# Patient Record
Sex: FEMALE | Race: WHITE
[De-identification: names, ages, dates, MRNs, and addresses within clinical notes are randomized per-mention and may not be internally consistent; named-entity substitution may affect disease eponyms.]

---

## 2019-07-16 ENCOUNTER — HOSPITAL ENCOUNTER (INPATIENT)
Dept: HOSPITAL 96 - M.ERS | Age: 84
LOS: 2 days | Discharge: HOME | DRG: 178 | End: 2019-07-18
Attending: INTERNAL MEDICINE | Admitting: INTERNAL MEDICINE
Payer: MEDICARE

## 2019-07-16 VITALS — DIASTOLIC BLOOD PRESSURE: 69 MMHG | SYSTOLIC BLOOD PRESSURE: 157 MMHG

## 2019-07-16 VITALS — SYSTOLIC BLOOD PRESSURE: 129 MMHG | DIASTOLIC BLOOD PRESSURE: 51 MMHG

## 2019-07-16 VITALS — BODY MASS INDEX: 25.97 KG/M2 | HEIGHT: 64.02 IN | WEIGHT: 152.1 LBS

## 2019-07-16 VITALS — SYSTOLIC BLOOD PRESSURE: 162 MMHG | DIASTOLIC BLOOD PRESSURE: 62 MMHG

## 2019-07-16 VITALS — SYSTOLIC BLOOD PRESSURE: 184 MMHG | DIASTOLIC BLOOD PRESSURE: 98 MMHG

## 2019-07-16 VITALS — DIASTOLIC BLOOD PRESSURE: 62 MMHG | SYSTOLIC BLOOD PRESSURE: 148 MMHG

## 2019-07-16 DIAGNOSIS — Z90.710: ICD-10-CM

## 2019-07-16 DIAGNOSIS — I10: ICD-10-CM

## 2019-07-16 DIAGNOSIS — D68.69: ICD-10-CM

## 2019-07-16 DIAGNOSIS — Z79.01: ICD-10-CM

## 2019-07-16 DIAGNOSIS — Z88.8: ICD-10-CM

## 2019-07-16 DIAGNOSIS — I48.0: ICD-10-CM

## 2019-07-16 DIAGNOSIS — E89.0: ICD-10-CM

## 2019-07-16 DIAGNOSIS — G43.909: ICD-10-CM

## 2019-07-16 DIAGNOSIS — I70.90: ICD-10-CM

## 2019-07-16 DIAGNOSIS — Z79.899: ICD-10-CM

## 2019-07-16 DIAGNOSIS — J15.6: Primary | ICD-10-CM

## 2019-07-16 DIAGNOSIS — Z86.73: ICD-10-CM

## 2019-07-16 LAB
ABSOLUTE BASOPHILS: 0 THOU/UL (ref 0–0.2)
ABSOLUTE EOSINOPHILS: 0.3 THOU/UL (ref 0–0.7)
ABSOLUTE MONOCYTES: 0.6 THOU/UL (ref 0–1.2)
ALBUMIN SERPL-MCNC: 3.6 G/DL (ref 3.4–5)
ALP SERPL-CCNC: 95 U/L (ref 46–116)
ALT SERPL-CCNC: 21 U/L (ref 30–65)
ANION GAP SERPL CALC-SCNC: 7 MMOL/L (ref 7–16)
APTT BLD: 27.3 SECONDS (ref 25–31.3)
AST SERPL-CCNC: 16 U/L (ref 15–37)
BASOPHILS NFR BLD AUTO: 0.6 %
BILIRUB SERPL-MCNC: 0.5 MG/DL
BUN SERPL-MCNC: 14 MG/DL (ref 7–18)
CALCIUM SERPL-MCNC: 9.8 MG/DL (ref 8.5–10.1)
CHLORIDE SERPL-SCNC: 105 MMOL/L (ref 98–107)
CK-MB MASS: 1.1 NG/ML
CO2 SERPL-SCNC: 32 MMOL/L (ref 21–32)
CREAT SERPL-MCNC: 0.8 MG/DL (ref 0.6–1.3)
EOSINOPHIL NFR BLD: 3.6 %
GLUCOSE SERPL-MCNC: 115 MG/DL (ref 70–99)
GRANULOCYTES NFR BLD MANUAL: 58.1 %
HCT VFR BLD CALC: 45.1 % (ref 37–47)
HGB BLD-MCNC: 15.1 GM/DL (ref 12–15)
INR PPP: 1
LIPASE: 76 U/L (ref 73–393)
LYMPHOCYTES # BLD: 2.3 THOU/UL (ref 0.8–5.3)
LYMPHOCYTES NFR BLD AUTO: 30.1 %
MAGNESIUM SERPL-MCNC: 2 MG/DL (ref 1.8–2.4)
MCH RBC QN AUTO: 31.1 PG (ref 26–34)
MCHC RBC AUTO-ENTMCNC: 33.4 G/DL (ref 28–37)
MCV RBC: 93 FL (ref 80–100)
MONOCYTES NFR BLD: 7.6 %
MPV: 7.4 FL. (ref 7.2–11.1)
NEUTROPHILS # BLD: 4.4 THOU/UL (ref 1.6–8.1)
NT-PRO BRAIN NAT PEPTIDE: 657 PG/ML (ref ?–300)
NUCLEATED RBCS: 0 /100WBC
PLATELET COUNT*: 338 THOU/UL (ref 150–400)
POTASSIUM SERPL-SCNC: 3.5 MMOL/L (ref 3.5–5.1)
PROT SERPL-MCNC: 7.7 G/DL (ref 6.4–8.2)
PROTHROMBIN TIME: 10 SECONDS (ref 9.2–11.5)
RBC # BLD AUTO: 4.85 MIL/UL (ref 4.2–5)
RDW-CV: 13.2 % (ref 10.5–14.5)
SODIUM SERPL-SCNC: 144 MMOL/L (ref 136–145)
TROPONIN-I LEVEL: <0.06 NG/ML (ref ?–0.06)
WBC # BLD AUTO: 7.6 THOU/UL (ref 4–11)

## 2019-07-16 NOTE — 2DMMODE
[place] [address]
Phone:  [phone] 2 D/M-MODE ECHOCARDIOGRAM     
_______________________________________________________________________________
 
Name:         KEE HELMS             Room:          [address] [place] 
[facility]#:    [id number]     Account #:     [id number]  
Admission:    19    Attend Phys:   Codey De León, 
Discharge:                Date of Birth: [birth year]  
Date of Service: 19 1645  Report #:      [id number]
        [id number]
_______________________________________________________________________________
THIS REPORT FOR:  //name//                      
 
 
--------------- APPROVED REPORT --------------
 
 
Study performed:  2019 15:02:56
 
EXAM: Comprehensive 2D, Doppler, and color-flow 
Echocardiogram 
Patient Location: In-Patient   
Room #:  er     Status:  routine
 
      BSA:         1.71
HR: 67 bpm BP:          168/85 mmHg 
Rhythm: NSR     
 
Other Information 
Study Quality: Good
 
Indications
Atrial Fibrillation
 
2D Dimensions
IVSd:  12.14 (7-11mm) LVOT Diam:  22.26 (18-24mm) 
LVDd:  32.81 mm  
PWd:  11.66 (7-11mm) Ascending Ao:  42.37 (22-36mm)
LVDs:  22.88 (25-40mm) 
Aortic Root:  29.67 mm 
 
Volumes
Left Atrial Volume (Systole) 
    LA ESV Index:  26.90 mL/m2
 
Aortic Valve
AoV Peak Errol.:  1.32 m/s 
AO Peak Gr.:  6.97 mmHg  LVOT Max P.32 mmHg
AO Mean Gr.:  3.44 mmHg  LVOT Mean P.11 mmHg
    LVOT Max V:  1.04 m/s
AO V2 VTI:  22.31 cm  LVOT Mean V:  0.66 m/s
JAM (VTI):  3.46 cm2  LVOT V1 VTI:  19.81 cm
 
Mitral Valve
    E/A Ratio:  0.75
    MV Decel. Time:  240.35 ms
MV E Max Errol.:  0.66 m/s 
 
 
[place] [address]
Phone:  [phone]                     2 D/M-MODE ECHOCARDIOGRAM     
_______________________________________________________________________________
 
Name:         KEE HELMS             Room:          [address] [place] 
.R.#:    [id number]     Account #:     [id number]  
Admission:    19    Attend Phys:   Codey De León, 
Discharge:                Date of Birth: [birth year]  
Date of Service: 19 1645  Report #:      [id number]
        [id number]
_______________________________________________________________________________
MV PHT:  69.70 ms  
MVA (PHT):  3.16 cm2  
 
TDI
E/Lateral E':  9.43 E/Medial E':  13.20
   Medial E' Errol.:  0.05 m/s
   Lateral E' Errol.:  0.07 m/s
 
Pulmonary Valve
PV Peak Errol.:  0.97 m/s PV Peak Gr.:  3.73 mmHg
 
Tricuspid Valve
    RAP Estimate:  5.00 mmHg
TR Peak Gr.:  20.01 mmHg RVSP:  25.00 mmHg
    PA Pressure:  25.00 mmHg
 
Left Ventricle
The left ventricle is normal size. There is normal LV segmental wall 
motion. There is normal left ventricular wall thickness. Left 
ventricular systolic function is normal. LVEF is 60-65%. Grade I - 
abnormal relaxation pattern.
 
Right Ventricle
The right ventricle is normal size. The right ventricular systolic 
function is normal.
 
Atria
The left atrium size is normal. The right atrium size is 
normal.
 
Aortic Valve
The aortic valve is normal in structure. Trace aortic regurgitation. 
There is no aortic valvular stenosis.
 
Mitral Valve
Mild bileaflet mitral valve prolapse Moderate mitral regurgitation. 
No evidence of mitral valve stenosis.
 
Tricuspid Valve
The tricuspid valve is normal in structure. Trace tricuspid 
regurgitation. No pulmonary hypertension.
 
Pulmonic Valve
The pulmonary valve is normal in structure. Mild pulmonic 
regurgitation.
 
 
 
[place] [address]
Phone:  [phone] 2 D/M-MODE ECHOCARDIOGRAM     
_______________________________________________________________________________
 
Name:         KEE HELMS             Room:          [address] [place] 
[facility]#:    [id number]     Account #:     [id number]  
Admission:    19    Attend Phys:   Codey De León, 
Discharge:                Date of Birth: [birth year]  
Date of Service: 19 1645  Report #:      [id number]
        [id number]
_______________________________________________________________________________
Great Vessels
The aortic root is normal in size. The ascending aorta is moderately 
dilated. (4.24 cm) IVC is normal in size and collapses >50% with 
inspiration.
 
Pericardium
There is no pericardial effusion.
 
<Conclusion>
The left ventricle is normal size.
There is normal left ventricular wall thickness.
Left ventricular systolic function is normal.
LVEF is 60-65%.
Grade I - abnormal relaxation pattern.
Trace aortic regurgitation.
Mild bileaflet mitral valve prolapse
Moderate mitral regurgitation.
Trace tricuspid regurgitation.
No pulmonary hypertension.
Mild pulmonic regurgitation.
IVC is normal in size and collapses >50% with inspiration.
The ascending aorta is moderately dilated. (4.24 cm)
 
 
 
 
 
 
 
 
 
 
 
 
 
 
 
 
 
 
 
 
 
 
  <ELECTRONICALLY SIGNED>
                                           By: Patel Franco MD, FACC   
  19     1645
D: 19 1645   _____________________________________
T: 19 1645   Patel Franco MD, FACC     /INF

## 2019-07-16 NOTE — EKG
[place] [address]
Phone:  [phone]                     ELECTROCARDIOGRAM REPORT      
_______________________________________________________________________________
 
Name:       KEE HELMS              Room:           [address]    ADM [place]  
.R.#:  [id number]      Account #:      [id number]  
Admission:  19     Attend Phys:    Codey De León MD 
Discharge:               Date of Birth:  [birth year]  
         Report #: [id number]
    [id number]
_______________________________________________________________________________
THIS REPORT FOR:  //name//                      
 
                         [facility] ED
                                       
Test Date:    2019               Test Time:    12:34:23
Pat Name:     KEE HELMS          Department:   
Patient ID:   [id number]            Room:         [facility]
Gender:       F                        Technician:   
:          [id number]               Requested By: Vernon Agee
Order Number: [id number] MD:   Patel Franco
                                 Measurements
Intervals                              Axis          
Rate:         144                      P:            
PA:                                    QRS:          -64
QRSD:         121                      T:            100
QT:           277                                    
QTc:          429                                    
                           Interpretive Statements
Atrial fibrillation with rapid ventricular response
Left bundle branch block
Compared to ECG 04/10/2010 16:47:47
Left bundle-branch block now present
Sinus rhythm no longer present
Atrial premature complex(es) no longer present
First degree AV block no longer present
Right superior axis no longer present
Myocardial infarct finding no longer present
 
Electronically Signed On 2019 16:25:51 CDT by Patel Franco
[email or web address]
 
 
 
 
 
 
 
 
 
 
 
 
 
  <ELECTRONICALLY SIGNED>
                                           By: Patel Franco MD, FAC   
  19     1625
D: 19 1234   _____________________________________
T: 19 1234   Patel Franco MD, [facility]     /EPI

## 2019-07-16 NOTE — EKG
[place] [address]
Phone:  [phone]                     ELECTROCARDIOGRAM REPORT      
_______________________________________________________________________________
 
Name:       KEE HELMS              Room:           [address]    ADM [place]  
.R.#:  [id number]      Account #:      [id number]  
Admission:  19     Attend Phys:    Codey De León MD 
Discharge:               Date of Birth:  [birth year]  
         Report #: [id number]
    [id number]
_______________________________________________________________________________
THIS REPORT FOR:  //name//                      
 
                         [facility] ED
                                       
Test Date:    2019               Test Time:    13:12:31
Pat Name:     KEE HELMS          Department:   
Patient ID:   [id number]            Room:         [facility]
Gender:       F                        Technician:   
:          [id number]               Requested By: Vernon Agee
Order Number: [id number] MD:   Patel Franco
                                 Measurements
Intervals                              Axis          
Rate:         82                       P:            -9
FL:           227                      QRS:          -58
QRSD:         128                      T:            83
QT:           415                                    
QTc:          485                                    
                           Interpretive Statements
Sinus rhythm
Atrial premature complex
Prolonged FL interval
Consider left atrial enlargement
Left bundle branch block
Compared to ECG 04/10/2010 16:47:47
Left bundle-branch block now present
Right superior axis no longer present
Myocardial infarct finding no longer present
 
Electronically Signed On 2019 16:25:58 CDT by Patel Franco
[email or web address]
 
 
 
 
 
 
 
 
 
 
 
 
 
  <ELECTRONICALLY SIGNED>
                                           By: Patel Franco MD, FACC   
  19     1625
D: 19 1312   _____________________________________
T: 19 1312   Patel Franco MD, FACC     /EPI

## 2019-07-16 NOTE — NUR
PT ADMITTED TO ROOM 211 VIA CART FROM ED AT APPROXIMATELY 1600 WITH PNEUMONIA
AND AFIB. PT ORIENTED TO ROOM AND CALL LIGHT. ADMISSION ASSESSMENT AND HISTORY
COMPLETED. REFER TO CHARTING. PT SCREENED NEGATIVE FOR SEPSIS. MEDICARE AND
FALL AGREEMENT FORM SIGNED AND PLACED IN FRONT OF CHART. PT TRACING SR WITH
BBB, FIRST DEGREE AND PACS ON THE CARDIAC MONITOR. ON 2L NC SAT UPPER 90'S. PT
UP WITH 1 ASSIST AND WALKER TO BATHROOM. WEAKNESS NOTED. SON AND DAUGHTER AT
BEDSIDE AND UPDATED ON CURRENT PLAN OF CARE. CARDIOLOGY CONSULT IN PLACE. HOME
MEDICATIONS RECONCILED AND RESTARTED. REFER TO EMAR. PT REPOSITIONS SELF.
HOURLY ROUNDING OBSERVED. BED IN LOW POSITION. CALL LIGHT WITHIN REACH. WILL
CONTINUE PLAN OF CARE.

## 2019-07-17 VITALS — SYSTOLIC BLOOD PRESSURE: 176 MMHG | DIASTOLIC BLOOD PRESSURE: 67 MMHG

## 2019-07-17 VITALS — SYSTOLIC BLOOD PRESSURE: 107 MMHG | DIASTOLIC BLOOD PRESSURE: 59 MMHG

## 2019-07-17 VITALS — DIASTOLIC BLOOD PRESSURE: 58 MMHG | SYSTOLIC BLOOD PRESSURE: 136 MMHG

## 2019-07-17 VITALS — SYSTOLIC BLOOD PRESSURE: 123 MMHG | DIASTOLIC BLOOD PRESSURE: 55 MMHG

## 2019-07-17 VITALS — SYSTOLIC BLOOD PRESSURE: 137 MMHG | DIASTOLIC BLOOD PRESSURE: 62 MMHG

## 2019-07-17 LAB
ABSOLUTE BASOPHILS: 0.1 THOU/UL (ref 0–0.2)
ABSOLUTE EOSINOPHILS: 0.3 THOU/UL (ref 0–0.7)
ABSOLUTE MONOCYTES: 0.7 THOU/UL (ref 0–1.2)
ANION GAP SERPL CALC-SCNC: 5 MMOL/L (ref 7–16)
BASOPHILS NFR BLD AUTO: 1 %
BUN SERPL-MCNC: 16 MG/DL (ref 7–18)
CALCIUM SERPL-MCNC: 9 MG/DL (ref 8.5–10.1)
CHLORIDE SERPL-SCNC: 106 MMOL/L (ref 98–107)
CHOLEST SERPL-MCNC: 183 MG/DL (ref ?–200)
CO2 SERPL-SCNC: 34 MMOL/L (ref 21–32)
CREAT SERPL-MCNC: 0.7 MG/DL (ref 0.6–1.3)
EOSINOPHIL NFR BLD: 4.8 %
GLUCOSE SERPL-MCNC: 93 MG/DL (ref 70–99)
GRANULOCYTES NFR BLD MANUAL: 54.2 %
HCT VFR BLD CALC: 37.9 % (ref 37–47)
HDLC SERPL-MCNC: 78 MG/DL (ref 40–?)
HGB BLD-MCNC: 12.8 GM/DL (ref 12–15)
LDLC SERPL-MCNC: 100 MG/DL (ref ?–100)
LYMPHOCYTES # BLD: 1.8 THOU/UL (ref 0.8–5.3)
LYMPHOCYTES NFR BLD AUTO: 29.4 %
MCH RBC QN AUTO: 31.5 PG (ref 26–34)
MCHC RBC AUTO-ENTMCNC: 33.7 G/DL (ref 28–37)
MCV RBC: 93.6 FL (ref 80–100)
MONOCYTES NFR BLD: 10.6 %
MPV: 7.4 FL. (ref 7.2–11.1)
NEUTROPHILS # BLD: 3.4 THOU/UL (ref 1.6–8.1)
NUCLEATED RBCS: 0 /100WBC
PLATELET COUNT*: 277 THOU/UL (ref 150–400)
POTASSIUM SERPL-SCNC: 4 MMOL/L (ref 3.5–5.1)
RBC # BLD AUTO: 4.05 MIL/UL (ref 4.2–5)
RDW-CV: 13.4 % (ref 10.5–14.5)
SODIUM SERPL-SCNC: 145 MMOL/L (ref 136–145)
TC:HDL: 2.3 RATIO
TRIGL SERPL-MCNC: 29 MG/DL (ref ?–150)
VLDLC SERPL CALC-MCNC: 6 MG/DL (ref ?–40)
WBC # BLD AUTO: 6.2 THOU/UL (ref 4–11)

## 2019-07-17 NOTE — CARDNUC
[place] [address]
Phone:  [phone]                     CARDIAC NUCLEAR IMAGING REPORT
_______________________________________________________________________________
 
Name:         KEE HELMS             Room:          [address] [place] 
[facility]#:    [id number]     Account #:     [id number]  
Admission:    07/16/19    Attend Phys:   Codey De León, 
Discharge:                Date of Birth: [birth year]  
Date of Service: 07/17/19 1630  Report #:      [id number]
        [id number] 
_______________________________________________________________________________
THIS REPORT FOR:  //name//                      
 
 
--------------- APPROVED REPORT --------------
 
 
Imaging Protocol: Rest Tc-99m/Stress Tc-99m 1 day
Study performed:  07/16/2019 14:19:00
 
Indication: Chest pain
Patient Location: In-Patient
Stress Tech: Mitra Vega
Stress Nurse: Claudia Zurita RN
NM Tech:SCOTTY Ivey
 
Ht: 5 ft 3 in  Wt: 146 lbs  BSA:  1.69 m2
    BMI:  25.85
 
Medical History
Medical History: tia, pat, htn
Medications: plavix, sotalol
Allergies: opiate agonists, meperidine, antihistamine, 
promethazine
Cardiac Risk Factors: age, htn, family hx
Exercise History: Sedentary
 
Resting Data
Rest SPECT myocardial perfusion imaging was performed in supine 
position 30 minutes following the intravenous injection of 10.7 mCi 
of Tc-99m Sestamibi.
Time of rest injection: 0805     Date: 07/17/2019
The images were gated to evaluate regional wall motion and calculate 
left ventricular ejection fraction. 
Administration Route: IV
 
Pharmacologic Stress
Pharmacologic stress test was performed by injecting Regadenoson 0.4 
mg IV push over 10-15 seconds immediately followed by the intravenous 
injection of 35.3 mCi of Tc-99m Sestamibi.
Time of stress injection: 0945     Date: 07/17/2019
Administration Route: IV
Gated Stress SPECT was performed 40 minutes after stress 
injection.
The images were gated to evaluate regional wall motion and calculate 
left ventricular ejection fraction. 
Stress only was performed in the Supine position.
 
 
[place] [address]
Phone:  [phone]                     CARDIAC NUCLEAR IMAGING REPORT
_______________________________________________________________________________
 
Name:         KEE HELMS             Room:          [address] [place] 
..#:    [id number]     Account #:     [id number]  
Admission:    07/16/19    Attend Phys:   Codey De León, 
Discharge:                Date of Birth: [birth year]  
Date of Service: 07/17/19 1630  Report #:      [id number]
        [id number] 
_______________________________________________________________________________
 
Stress Test Details
Stress Test:  Pharmacologic stress testing performed using 0.4 mg of 
regadenoson per 5 mL given IV over 10 seconds.      
  Reason for pharmacologic stress test: arthritis.      
 
HR      Max Heart Rate (APMHR): 139 bpm  
Resting HR:            57 bpm   Target HR (85% APMHR): 118 bpm  
Max HR Achieved:  80 bpm        
% of APMHR:         57         
 
BP           
Resting BP:  149/73 mmHg        
Max BP:       163/84 mmHg        
Recovery BP:       166/88 mmHg        
ECG           
Resting ECG:  Sinus Rhythm, LBBB       
Stress ECG:     Sinus Rhythm, LBBB       
ST Change: None          
Arrhythmia:    None         
Recovery ECG: Sinus Rhythm, LBBB       
Recovery ST Change: None        
Recovery Arrhythmia: None        
 
Clinical
Reason for Termination: Completed protocol
Exercise duration: 0 min  sec
Exercise capacity: 1 METs
The patient had some chest discomfort with Lexiscan infusion felt to 
be due to medication effect in light of myocardial perfusion imaging 
findings.
 
Nurse Comments
pt co cp during test resolved with caffeine
 
Stress ECG Conclusion
Baseline EKG shows sinus rhythm with left bundle-branch block. EKGs 
obtained during and post Lexiscan infusion show sinus rhythm with 
left bundle-branch block. There were no stress-induced 
arrhythmias.
 
Study Quality
Study: Fair
Artifact: Moderate Soft tissue attenuation artifact
 
Study Data
 
 
[place] [address]
Phone:  [phone]                     CARDIAC NUCLEAR IMAGING REPORT
_______________________________________________________________________________
 
Name:         KEE HELMS             Room:          [address] [place] 
..#:    [id number]     Account #:     [id number]  
Admission:    07/16/19    Attend Phys:   Codey De León, 
Discharge:                Date of Birth: [birth year]  
Date of Service: 07/17/19 1630  Report #:      [id number]
        [id number] 
_______________________________________________________________________________
At rest, the left ventricular ejection fraction was 70%..   
Post stress, the left ventricular ejection was 63%..   
TID = 0.97.       
 
Perfusion
There is moderate attenuation of the inferior wall noted. No 
significant fixed or reversible defects were identified.
 
Wall Motion
Normal left ventricular wall motion.
 
Nuclear Conclusion
ECG Findings: non-diagnostic 
Clinical Findings: equivocal 
Nuclear Findings: negative for ischemia 
Exercise Capacity: not assessed
Left Ventricular Function: normal 
Risk Study: low
Perfusion images show no defect to suggest infarct or ischemia. Left 
ventricular systolic function is normal on gated studies. This is a 
low risk study. 
 
<Conclusion>
Baseline EKG shows sinus rhythm with left bundle-branch block. EKGs 
obtained during and post Lexiscan infusion show sinus rhythm with 
left bundle-branch block. There were no stress-induced arrhythmias.
 
 
 
 
 
 
 
 
 
 
 
 
 
 
 
 
 
 
  <ELECTRONICALLY SIGNED>
                                           By: Patel Franco MD, FACC   
  07/17/19     1630
D: 07/17/19 1630   _____________________________________
T: 07/17/19 1630   Patel Franco MD, FACC     /INF

## 2019-07-17 NOTE — NUR
ASSUSMED CARE OF PT APPROX 0730. REASSESSMENT COMPLETED AS CHARTED THIS AM. PT
EXPRESSED CONCERN ABOUT PROCEDURE THIS AM. PT EDUCATION GIVEN ABOUT PROCEDURE.
PT VERBALIZED UNDERSTANDING. PT EDUCATION GIVEN ABOUT NEW MEDICATION PT IS
TAKING. PT VERBALIZED UNDERSTNADING.  HOURLY ROUNDING COMPLETED. PERSONAL
ITEMS AND CALL LIGHT WITHIN REACH.

## 2019-07-17 NOTE — NUR
ASSUMED CARE OF PT AT 1900. PT IS ALERT AND ORIENTED. VSS. PERRLA. NO
COMPLAINTS OF PAIN. PT IS NPO FOR STRESS TEST. PT IS IN SINUS CHANO ON THE
TELEMETRY. PT IS RESTING COMFORTABLY IN BED. RESPIRATIONS ARE ENEN AND
NONLABORED. WILL CONTINUE TO MONITOR PT.

## 2019-07-17 NOTE — EKG
[place] [address]
Phone:  [phone]                     ELECTROCARDIOGRAM REPORT      
_______________________________________________________________________________
 
Name:       KEE HELMS              Room:           [address]    ADM IN  
M.R.#:  [id number]      Account #:      [id number]  
Admission:  19     Attend Phys:    Codey De León MD 
Discharge:               Date of Birth:  [birth year]  
         Report #: [id number]
    [id number]
_______________________________________________________________________________
THIS REPORT FOR:  //name//                      
 
                          [facility]
                                       
Test Date:    2019               Test Time:    10:04:13
Pat Name:     KEE HELMS          Department:   
Patient ID:   [id number]            Room:         [address]
Gender:       F                        Technician:   
:          [id number]               Requested By: Patel Franco
Order Number: [id number]    Reading MD:   Jorge Pagan
                                 Measurements
Intervals                              Axis          
Rate:         68                       P:            -45
SC:           222                      QRS:          -61
QRSD:         123                      T:            64
QT:           448                                    
QTc:          477                                    
                           Interpretive Statements
Sinus or ectopic atrial rhythm
Prolonged SC interval
Probable left atrial enlargement
Left bundle branch block
Baseline wander in lead(s) V4
Compared to ECG 2019 13:12:31
Atrial premature complex(es) no longer present
 
Electronically Signed On 2019 17:20:38 CDT by Jorge Pagan
[email or web address]
 
 
 
 
 
 
 
 
 
 
 
 
 
 
 
  <ELECTRONICALLY SIGNED>
                                           By: Jorge Pagan MD, [facility]     
  19     1720
D: 19 1004   _____________________________________
T: 19 1004   Jorge Pagan MD, [facility]       /EPI

## 2019-07-18 VITALS — DIASTOLIC BLOOD PRESSURE: 55 MMHG | SYSTOLIC BLOOD PRESSURE: 184 MMHG

## 2019-07-18 VITALS — SYSTOLIC BLOOD PRESSURE: 148 MMHG | DIASTOLIC BLOOD PRESSURE: 75 MMHG

## 2019-07-18 VITALS — DIASTOLIC BLOOD PRESSURE: 42 MMHG | SYSTOLIC BLOOD PRESSURE: 138 MMHG

## 2019-07-18 VITALS — SYSTOLIC BLOOD PRESSURE: 138 MMHG | DIASTOLIC BLOOD PRESSURE: 42 MMHG

## 2019-07-18 VITALS — DIASTOLIC BLOOD PRESSURE: 48 MMHG | SYSTOLIC BLOOD PRESSURE: 151 MMHG

## 2019-07-18 NOTE — NUR
ASSUMED PT CARE AT 0730, FULL ASSESMENT DONE AS CHARTED. PT A/O X4, DENIES
PAIN, VSS, HR IN THE 40'S-50'S, ASYMPTOMATIC. PT STATES HER HR IS NORMALLY
LOW. DR ORTEZ NOTIFIED. DISCHARGE PAPERS RECIEVED, SCRIPTS RECIEVED. REVIEWED
THESE WITH THE PT AND HER DAUGHTER. DTR VERBALIZED UNDERSTANDING. PT WILL NEED
ASSISTANCE WITH NEW MEDICATIONS, SHE IS CONCERNED ABOUT KEEPING THEM STRAIT,
SUGGESTED THE USE OF A DAILY MEDICATION BOX. DAUGHTER STATES SHE WILL HELP.
PTS IV REMOVED, BELONGINGS GATHERED AND PT LEFT UNIT BY  AT APPROX 1455

## 2019-07-18 NOTE — CON
[address]  [address]                    CONSULTATION                  
_______________________________________________________________________________
 
Name:       KEE HELMS              Room:           [address] [place]  
M.R.#:  [id number]      Account #:      [id number]  
Admission:  07/16/19     Attend Phys:    Codey De León MD 
Discharge:  07/18/19     Date of Birth:  [birth year]  
         Report #: [id number]
                                                                     [id number]  
_______________________________________________________________________________
THIS REPORT FOR:  //name//                      
 
CC: Codey Peck DO
 
CARDIOLOGY CONSULTATION
 
INDICATION:  Paroxysmal atrial fibrillation.
 
HISTORY OF PRESENT ILLNESS:  The patient is a very pleasant 83-year-old white
female with no prior cardiac history with the exception of light stroke many
years ago for which she takes Plavix.  She presented to the hospital this
morning after having an episode of prolonged palpitations described as her heart
pounding.  She was found to be in atrial fibrillation with a rapid ventricular
response.  She spontaneously converted to sinus rhythm prior to any medications
being administered.  She reports similar symptoms off and on over the past
several months.  She has never been formally documented with atrial fibrillation
prior.
 
PAST MEDICAL HISTORY:
1.  Stroke.
2.  Hysterectomy.
3.  Thyroidectomy.
4.  Hernia surgery.
 
FAMILY HISTORY:  The patient's son had atrial fibrillation ablation.
 
SOCIAL HISTORY:  The patient does not smoke.  She does not drink alcohol.
 
ALLERGIES:  DARK CHOCOLATE.
 
MEDICATIONS:  Plavix 75 mg daily and a multivitamin daily.
 
REVIEW OF SYSTEMS:  A 14-point review of systems is positive for pneumonia 2
years ago, palpitations presently, arthritis and she wears dentures.  Otherwise,
14-point review of systems was unremarkable.
 
PHYSICAL EXAMINATION:
VITAL SIGNS:  Blood pressure 168/85 and pulse 68.
GENERAL:  This is a pleasant elderly female in no distress.  Mood and affect
appropriate.
HEENT:  Extraocular muscles intact.  Mucous membranes are moist.
NECK:  Shows no jugular venous distention.  There are no carotid bruits.
CHEST:  Reveals coarse breath sounds over the left lung.
CARDIOVASCULAR:  Reveals a regular rhythm with soft grade 2/6 systolic ejection
 
 
 
[place] [address]                    CONSULTATION                  
_______________________________________________________________________________
 
Name:       ENGLISHKEE J              Room:           [address]#:  [id number]      Account #:      [id number]  
Admission:  07/16/19     Attend Phys:    Codey De León MD 
Discharge:  07/18/19     Date of Birth:  [birth year]  
         Report #: [id number]
                                                                     [id number]  
_______________________________________________________________________________
murmur.
ABDOMEN:  Reveals normal bowel sounds.  The abdomen is soft, nontender.
EXTREMITIES:  Shows no edema.
SKIN:  Warm and dry.  Peripheral pulses palpable.
 
LABORATORY AND DIAGNOSTIC DATA:  EKG on admission shows atrial fibrillation with
rapid ventricular response rate and bundle branch block.
 
Labs are reviewed.  Sodium 144, potassium 3.5, chloride 105, bicarbonate 32, BUN
14, creatinine 0.8 and serum glucose 115.  LFTs within normal limits.  Troponin
less than 0.06.  NT-proBNP 657.  Coags within normal limits.  White blood cell
count 7.6, hemoglobin 15.1 and platelet count 338,000.
 
Chest x-ray shows left basilar infiltrate.  There is note of calcification of
the thoracic aorta.
 
IMPRESSION AND RECOMMENDATIONS:
1.  Paroxysmal atrial fibrillation.  The patient's CHADS score is 2.  She
warrants anticoagulation chronically.  She is maintaining sinus rhythm
presently.  We will start low dose sotalol.
2.  Discomforts of the shoulders with episode of atrial fibrillation.  We will
proceed with stress testing.
3.  Atherosclerosis.  We will check fasting lipid profile as the patient likely
would benefit from a lipid lowering agent.
4.  Hypertension.  We will adjust medications as needed.
 
 
 
 
 
 
 
 
 
 
 
 
 
 
 
 
 
 
 
<ELECTRONICALLY SIGNED>
                                        By:  Patel Franco MD, FACC   
07/18/19     1702
D: 07/16/19 1415_______________________________________
T: 07/16/19 2251Microdrick Franco MD, FACC      /nt

## 2019-07-18 NOTE — EKG
[place] [address]
Phone:  [phone]                     ELECTROCARDIOGRAM REPORT      
_______________________________________________________________________________
 
Name:       KEE HELMS              Room:           [address]    DIS IN  
M.R.#:  [id number]      Account #:      [id number]  
Admission:  19     Attend Phys:    Codey De León MD 
Discharge:  19     Date of Birth:  [birth year]  
         Report #: [id number]
    [id number]
_______________________________________________________________________________
THIS REPORT FOR:  //name//                      
 
                          [facility]
                                       
Test Date:    2019               Test Time:    08:23:08
Pat Name:     KEE HELMS          Department:   
Patient ID:   [id number]            Room:         [address]
Gender:       F                        Technician:   
:          [id number]               Requested By: Patel Franco
Order Number: [id number]    Reading MD:   Patel Franco
                                 Measurements
Intervals                              Axis          
Rate:         48                       P:            6
GA:           244                      QRS:          -58
QRSD:         120                      T:            33
QT:           504                                    
QTc:          451                                    
                           Interpretive Statements
Sinus bradycardia
Atrial premature complexes
Prolonged GA interval
Left bundle-branch block 
Compared to ECG 2019 10:04:13
Atrial premature complex(es) now present
No significant changes noted
Electronically Signed On 2019 16:30:42 CDT by Patel Franco
[email or web address]
 
 
 
 
 
 
 
 
 
 
 
 
 
 
 
 
  <ELECTRONICALLY SIGNED>
                                           By: Patel Franco MD, FACC   
  19     1630
D: 1923   _____________________________________
T: 1923   Patel Franco MD, [facility]     /EPI

## 2019-08-14 ENCOUNTER — HOSPITAL ENCOUNTER (OUTPATIENT)
Dept: HOSPITAL 96 - M.LAB | Age: 84
End: 2019-08-14
Attending: NURSE PRACTITIONER
Payer: MEDICARE

## 2019-08-14 DIAGNOSIS — Z98.890: ICD-10-CM

## 2019-08-14 DIAGNOSIS — I48.0: Primary | ICD-10-CM

## 2019-08-16 ENCOUNTER — HOSPITAL ENCOUNTER (EMERGENCY)
Dept: HOSPITAL 96 - M.ERS | Age: 84
Discharge: HOME | End: 2019-08-16
Payer: MEDICARE

## 2019-08-16 VITALS — WEIGHT: 148 LBS | BODY MASS INDEX: 25.27 KG/M2 | HEIGHT: 64 IN

## 2019-08-16 VITALS — SYSTOLIC BLOOD PRESSURE: 168 MMHG | DIASTOLIC BLOOD PRESSURE: 69 MMHG

## 2019-08-16 DIAGNOSIS — N81.2: ICD-10-CM

## 2019-08-16 DIAGNOSIS — Z90.710: ICD-10-CM

## 2019-08-16 DIAGNOSIS — Z88.8: ICD-10-CM

## 2019-08-16 DIAGNOSIS — Z86.73: ICD-10-CM

## 2019-08-16 DIAGNOSIS — N39.0: Primary | ICD-10-CM

## 2019-08-16 DIAGNOSIS — I48.91: ICD-10-CM

## 2019-08-16 DIAGNOSIS — Z90.89: ICD-10-CM

## 2019-08-16 DIAGNOSIS — G43.909: ICD-10-CM

## 2019-08-16 LAB
ABSOLUTE BASOPHILS: 0.1 THOU/UL (ref 0–0.2)
ABSOLUTE EOSINOPHILS: 0.4 THOU/UL (ref 0–0.7)
ABSOLUTE MONOCYTES: 0.6 THOU/UL (ref 0–1.2)
ALBUMIN SERPL-MCNC: 3.3 G/DL (ref 3.4–5)
ALP SERPL-CCNC: 102 U/L (ref 46–116)
ALT SERPL-CCNC: 18 U/L (ref 30–65)
ANION GAP SERPL CALC-SCNC: 6 MMOL/L (ref 7–16)
APTT BLD: 28.2 SECONDS (ref 25–31.3)
AST SERPL-CCNC: 17 U/L (ref 15–37)
BACTERIA-REFLEX: (no result) /HPF
BASOPHILS NFR BLD AUTO: 1.2 %
BILIRUB SERPL-MCNC: 0.4 MG/DL
BILIRUB UR-MCNC: NEGATIVE MG/DL
BUN SERPL-MCNC: 13 MG/DL (ref 7–18)
CALCIUM SERPL-MCNC: 9.1 MG/DL (ref 8.5–10.1)
CHLORIDE SERPL-SCNC: 104 MMOL/L (ref 98–107)
CO2 SERPL-SCNC: 32 MMOL/L (ref 21–32)
COLOR UR: YELLOW
CREAT SERPL-MCNC: 0.7 MG/DL (ref 0.6–1.3)
EOSINOPHIL NFR BLD: 6.3 %
GLUCOSE SERPL-MCNC: 101 MG/DL (ref 70–99)
GRANULOCYTES NFR BLD MANUAL: 53.9 %
HCT VFR BLD CALC: 42.8 % (ref 37–47)
HGB BLD-MCNC: 14.1 GM/DL (ref 12–15)
INR PPP: 1
KETONES UR STRIP-MCNC: NEGATIVE MG/DL
LYMPHOCYTES # BLD: 1.7 THOU/UL (ref 0.8–5.3)
LYMPHOCYTES NFR BLD AUTO: 29.1 %
MCH RBC QN AUTO: 31.3 PG (ref 26–34)
MCHC RBC AUTO-ENTMCNC: 32.9 G/DL (ref 28–37)
MCV RBC: 95 FL (ref 80–100)
MONOCYTES NFR BLD: 9.5 %
MPV: 7.8 FL. (ref 7.2–11.1)
MUCUS: (no result) STRN/LPF
NEUTROPHILS # BLD: 3.2 THOU/UL (ref 1.6–8.1)
NUCLEATED RBCS: 0 /100WBC
PLATELET COUNT*: 320 THOU/UL (ref 150–400)
POTASSIUM SERPL-SCNC: 4.3 MMOL/L (ref 3.5–5.1)
PROT SERPL-MCNC: 7.3 G/DL (ref 6.4–8.2)
PROT UR QL STRIP: NEGATIVE
PROTHROMBIN TIME: 10.5 SECONDS (ref 9.2–11.5)
RBC # BLD AUTO: 4.51 MIL/UL (ref 4.2–5)
RBC # UR STRIP: (no result) /UL
RBC #/AREA URNS HPF: (no result) /HPF (ref 0–2)
RDW-CV: 13.4 % (ref 10.5–14.5)
SODIUM SERPL-SCNC: 142 MMOL/L (ref 136–145)
SP GR UR STRIP: 1.01 (ref 1–1.03)
SQUAMOUS: (no result) /LPF (ref 0–3)
URINE CLARITY: CLEAR
URINE GLUCOSE-RANDOM: NEGATIVE
URINE LEUKOCYTES-REFLEX: (no result)
URINE NITRITE-REFLEX: NEGATIVE
URINE WBC-REFLEX: (no result) /HPF (ref 0–5)
UROBILINOGEN UR STRIP-ACNC: 0.2 E.U./DL (ref 0.2–1)
WBC # BLD AUTO: 5.9 THOU/UL (ref 4–11)

## 2019-08-28 ENCOUNTER — HOSPITAL ENCOUNTER (OUTPATIENT)
Dept: HOSPITAL 96 - M.LAB | Age: 84
End: 2019-08-28
Attending: NURSE PRACTITIONER
Payer: MEDICARE

## 2019-08-28 DIAGNOSIS — I10: Primary | ICD-10-CM

## 2019-08-28 LAB
ANION GAP SERPL CALC-SCNC: 4 MMOL/L (ref 7–16)
BUN SERPL-MCNC: 13 MG/DL (ref 7–18)
CALCIUM SERPL-MCNC: 9.5 MG/DL (ref 8.5–10.1)
CHLORIDE SERPL-SCNC: 104 MMOL/L (ref 98–107)
CO2 SERPL-SCNC: 33 MMOL/L (ref 21–32)
CREAT SERPL-MCNC: 0.7 MG/DL (ref 0.6–1.3)
GLUCOSE SERPL-MCNC: 105 MG/DL (ref 70–99)
POTASSIUM SERPL-SCNC: 4.7 MMOL/L (ref 3.5–5.1)
SODIUM SERPL-SCNC: 141 MMOL/L (ref 136–145)

## 2022-06-14 NOTE — NUR
Patient calling ( identified name and  )  Tested at home COVID  + ( chart marked )     States ' throat is on fire\"  Not able to eat, can drink and swallow but with pain ,  bad headache, chills     Has tried Motrin with slight relief     Lives in [place], advised to go to nearest  to be evaluated     Patient verbalizes understanding and agrees. Pt is A&O. Resides at home with her son and dtr. Independent and active, dtr
provides transportation needs. Pt has a walker and cane at home that she can
use for mobility. No hx of HH or SNF. Goal is home at [place], no needs
anticipated. Following.